# Patient Record
Sex: MALE | URBAN - METROPOLITAN AREA
[De-identification: names, ages, dates, MRNs, and addresses within clinical notes are randomized per-mention and may not be internally consistent; named-entity substitution may affect disease eponyms.]

---

## 2018-11-01 ENCOUNTER — TRANSFERRED RECORDS (OUTPATIENT)
Dept: HEALTH INFORMATION MANAGEMENT | Facility: CLINIC | Age: 28
End: 2018-11-01

## 2018-11-29 ENCOUNTER — OFFICE VISIT (OUTPATIENT)
Dept: OPHTHALMOLOGY | Facility: CLINIC | Age: 28
End: 2018-11-29
Attending: OPHTHALMOLOGY
Payer: COMMERCIAL

## 2018-11-29 DIAGNOSIS — H16.423 PANNUS (CORNEAL), BILATERAL: ICD-10-CM

## 2018-11-29 DIAGNOSIS — H17.9 CORNEAL SCAR: Primary | ICD-10-CM

## 2018-11-29 PROCEDURE — G0463 HOSPITAL OUTPT CLINIC VISIT: HCPCS | Mod: ZF

## 2018-11-29 RX ORDER — FLUTICASONE PROPIONATE 50 MCG
SPRAY, SUSPENSION (ML) NASAL
Refills: 0 | COMMUNITY
Start: 2018-07-26

## 2018-11-29 RX ORDER — NEOMYCIN SULFATE, POLYMYXIN B SULFATE AND DEXAMETHASONE 3.5; 10000; 1 MG/ML; [USP'U]/ML; MG/ML
SUSPENSION/ DROPS OPHTHALMIC
COMMUNITY
Start: 2018-11-02

## 2018-11-29 RX ORDER — OLOPATADINE HYDROCHLORIDE 7 MG/ML
SOLUTION OPHTHALMIC
Refills: 3 | COMMUNITY
Start: 2018-11-02

## 2018-11-29 ASSESSMENT — VISUAL ACUITY
OD_SC+: -2
OD_SC: 20/20
METHOD: SNELLEN - LINEAR
OS_SC: 20/20

## 2018-11-29 ASSESSMENT — SLIT LAMP EXAM - LIDS
COMMENTS: NORMAL
COMMENTS: NORMAL

## 2018-11-29 ASSESSMENT — TONOMETRY
OS_IOP_MMHG: 13
IOP_METHOD: ICARE
OD_IOP_MMHG: 11

## 2018-11-29 ASSESSMENT — EXTERNAL EXAM - LEFT EYE: OS_EXAM: NORMAL

## 2018-11-29 ASSESSMENT — EXTERNAL EXAM - RIGHT EYE: OD_EXAM: NORMAL

## 2018-11-29 ASSESSMENT — CONF VISUAL FIELD
OS_NORMAL: 1
OD_NORMAL: 1
METHOD: COUNTING FINGERS

## 2018-11-29 NOTE — PROGRESS NOTES
CC - Red spots in the eyes    HPI -   Anna Ackerman is a  28 year old year-old patient who presents for evaluation of intermittent photophobia and redness. First episode noticed in 2010 and was treated with antibiotic drops per patient. Recurrent episodes have been managed with Maxitrol drops. Symptoms of redness, photophobia improve when on Maxitrol. Patient denies any flashes, floaters, blurry vision during these episodes.    No history of CTL use.  No history of eye trauma.    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 I personally obtained the chief complaint(s) and history of present illness.  I confirmed and edited as necessary the review of systems, past medical/surgical history, family history, social history, and examination findings as documented by others.  I examined the patient myself, and I personally reviewed the relevant tests, images, and reports as documented above. I formulated and edited as necessary the assessment and plan and discussed the findings and management plan with the patient and family.     Jhonny Coreas MD, MA  Director, Cornea & Anterior Segment  [facility] Department of Ophthalmology & Visual Neuroscience

## 2018-11-29 NOTE — LETTER
11/29/2018       RE: Anna Ackerman  [address]  [facility] [address]     Dear Colleague,    Thank you for referring your patient, Anna Ackerman, to the EYE CLINIC at [facility]. Please see a copy of my visit note below.    CC - Red spots in the eyes    HPI -   Anna Ackerman is a  28 year old year-old patient who presents for evaluation of intermittent photophobia and redness. First episode noticed in 2010 and was treated with antibiotic drops per patient. Recurrent episodes have been managed with Maxitrol drops. Symptoms of redness, photophobia improve when on Maxitrol. Patient denies any flashes, floaters, blurry vision during these episodes.    No history of CTL use.  No history of eye trauma.    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      Sincerely,      Jhonny Coreas MD, MA  Director, Cornea & Anterior Segment  Director, Fellowship in Cornea & External Disease  [facility] Department of Ophthalmology & Visual Neuroscience  : [employer] [phone]  Email: [email or web address]  Mobile: [phone]

## 2018-11-29 NOTE — MR AVS SNAPSHOT
After Visit Summary   2018    Anna Ackerman    MRN: [id number]           Patient Information     Date Of Birth          1990        Visit Information        Provider Department      2018 7:15 AM Jhonny Coreas MD [facility]        Today's Diagnoses     Corneal scar    -  1    Pannus (corneal), bilateral           Follow-ups after your visit        Follow-up notes from your care team     Return in about 6 months (around 2019).      Your next 10 appointments already scheduled     May 29, 2019  7:30 AM CDT   NEW CORNEA with Chris Ramsey MD   Eye Clinic ([facility])    [address]  [facility] [address] [place] [id number]   [phone]              Who to contact     Please call your clinic at [phone] to:    Ask questions about your health    Make or cancel appointments    Discuss your medicines    Learn about your test results    Speak to your doctor            Additional Information About Your Visit        MyChart Information     [employer] is an electronic gateway that provides easy, online access to your medical records. With [facility], you can request a clinic appointment, read your test results, renew a prescription or communicate with your care team.     To sign up for [employer] visit the website at [email or web address]   You will be asked to enter the access code listed below, as well as some personal information. Please follow the directions to create your username and password.     Your access code is: [email or web address]  Expires: 2019  6:30 AM     Your access code will  in 90 days. If you need help or a new code, please contact your [facility] or call [phone] for assistance.        Care EveryWhere ID     This is your Care EveryWhere ID. This could be used by other organizations to access your [place] medical records  [id number]         Blood Pressure from Last 3  Encounters:   No data found for BP    Weight from Last 3 Encounters:   No data found for Wt              Today, you had the following     No orders found for display       Primary Care Provider Fax #    Physician No Ref-Primary [phone]       No address on file        Equal Access to Services     JUNIOR BLAKENICHOLAS : Estela Carrillo, waboydda luqadaha, qaybta kaalmada ademelanie, clifford solomonluisa buffy. So [facility] [phone].    ATENCIÓN: Si habla español, tiene a cottrell disposición servicios gratuitos de asistencia lingüística. Llame al [phone].    We comply with applicable federal civil rights laws and [place] laws. We do not discriminate on the basis of race, color, national origin, age, disability, sex, sexual orientation, or gender identity.            Thank you!     Thank you for choosing EYE CLINIC  for your care. Our goal is always to provide you with excellent care. Hearing back from our patients is one way we can continue to improve our services. Please take a few minutes to complete the written survey that you may receive in the mail after your visit with us. Thank you!             Your Updated Medication List - Protect others around you: Learn how to safely use, store and throw away your medicines at www.disposemymeds.org.          This list is accurate as of 11/29/18  9:15 AM.  Always use your most recent med list.                   Brand Name Dispense Instructions for use Diagnosis    fluticasone 50 MCG/ACT nasal spray    FLONASE     SHAKE LQ AND U 1 SPR IEN QD        neomycin-polymyxin-dexamethasone 3.5-12398-0.1 Susp ophthalmic susp    MAXITROL          PAZEO 0.7 % ophthalmic solution   Generic drug:  olopatadine      INT 1 GTT IN OU QD

## 2018-11-29 NOTE — NURSING NOTE
Chief Complaints and History of Present Illnesses   Patient presents with     Consult For     Superficial keratitis     HPI    Affected eye(s):  Both   Symptoms:        Frequency:  Constant       Do you have eye pain now?:  No      Comments:  Since 2010 he has had flares of red spots in the eye in total 5.  States that it has switched eyes to the LE   +light sensitive and red when it flares  States va will decrease when it is flaring  Last flare up was in Oct  Migdalia Mendez COT 7:30 AM November 29, 2018